# Patient Record
Sex: FEMALE | Race: WHITE
[De-identification: names, ages, dates, MRNs, and addresses within clinical notes are randomized per-mention and may not be internally consistent; named-entity substitution may affect disease eponyms.]

---

## 2017-09-06 ENCOUNTER — HOSPITAL ENCOUNTER (EMERGENCY)
Dept: HOSPITAL 76 - ED | Age: 32
Discharge: HOME | End: 2017-09-06
Payer: COMMERCIAL

## 2017-09-06 VITALS — DIASTOLIC BLOOD PRESSURE: 84 MMHG | SYSTOLIC BLOOD PRESSURE: 128 MMHG
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
## 2017-09-06 DIAGNOSIS — Z23: ICD-10-CM

## 2017-09-06 DIAGNOSIS — S91.331A: Primary | ICD-10-CM

## 2017-09-06 PROCEDURE — 90471 IMMUNIZATION ADMIN: CPT

## 2017-09-06 PROCEDURE — 99283 EMERGENCY DEPT VISIT LOW MDM: CPT

## 2017-09-06 NOTE — ED PHYSICIAN DOCUMENTATION
PD HPI LOWER EXT INJURY





- Stated complaint


Stated Complaint: RT FOOT INJ





- Chief complaint


Chief Complaint: General





- History of Present Illness


PD HPI LOW EXT INJURY LOCATION: Right, Foot


Type of injury: Puncture wound


Where injury occurred: Street


Timing - onset: Today


Timing - details: Abrupt onset


Improved by: Immobilization


Worsened by: Moving, Palpating


Similar symptoms before: Has not had sx before


Recently seen: Not recently seen





- Additional information


Additional information: 





Patient is a 32 year old female with no significant past medical history who is 

presenting to the emergency department for a puncture wound.  patient states 

that she stepped on a nail earlier today.  





Review of Systems


Constitutional: denies: Fever, Chills


Eyes: reports: Loss of vision, Reviewed and negative


Nose: reports: Reviewed and negative


Throat: reports: Reviewed and negative


Respiratory: denies: Cough, Wheezing


GI: denies: Nausea, Vomiting


: reports: Reviewed and negative


Skin: reports: Lesions, Laceration (s)


Musculoskeletal: reports: Extremity pain.  denies: Extremity swelling


Neurologic: denies: Focal weakness, Numbness


Immunocompromised: denies: Immunocompromised





PD PAST MEDICAL HISTORY





- Past Medical History


Cardiovascular: None


Respiratory: None


Neuro: None


Endocrine/Autoimmune: None


GI: None


GYN: None


: None


HEENT: None


Psych: Anxiety


Musculoskeletal: None


Derm: Eczema





- Past Surgical History


Past Surgical History: No





- Present Medications


Home Medications: 


 Ambulatory Orders











 Medication  Instructions  Recorded  Confirmed


 


Ciprofloxacin/Ciprofloxa HCl 500 mg PO BID #10 tbmp.24hr 09/06/17 





[Ciprofloxacin  mg Tablet]   


 


DULoxetine [Cymbalta] 30 mg PO DAILY 09/06/17 09/06/17


 


Gabapentin 100 mg PO 09/06/17 














- Allergies


Allergies/Adverse Reactions: 


 Allergies











Allergy/AdvReac Type Severity Reaction Status Date / Time


 


No Known Drug Allergies Allergy   Verified 09/06/17 19:57














- Social History


Does the pt smoke?: No


Smoking Status: Never smoker


Does the pt drink ETOH?: No





- Immunizations


Immunizations are current?: No


Immunizations: TDAP >10years/unknown





PD ED PE NORMAL





- Vitals


Vital signs reviewed: Yes





- General


General: Alert and oriented X 3, No acute distress





- HEENT


HEENT: Atraumatic, PERRL





- Neck


Neck: Supple, no meningeal sign





- Cardiac


Cardiac: RRR, No murmur





- Respiratory


Respiratory: No respiratory distress





- Abdomen


Abdomen: Soft, Non tender, Non distended





- Derm


Derm: Warm and dry





- Neuro


Neuro: Alert and oriented X 3, No motor deficit, No sensory deficit, Normal 

speech





- Psych


Psych: Normal mood, Normal affect





PD ED PE EXPANDED





- Extremities


Extremities: Right foot (miniscule puncture wound of left foot, no erythema, 

minimal tenderness)





Results





- Vitals


Vitals: 


 Vital Signs - 24 hr











  09/06/17





  19:56


 


Temperature 36.3 C L


 


Heart Rate 83


 


Respiratory 16





Rate 


 


Blood Pressure 128/84 H


 


O2 Saturation 100








 Oxygen











O2 Source                      Room air

















PD MEDICAL DECISION MAKING





- ED course


Complexity details: reviewed old records, re-evaluated patient, d/w patient


ED course: 





Patient was seen and examined at bedside.  patient's wound was well appearing.  

Patient was treated with tetanus.  Patient was given detailed discharge 

instructions including antibiotics if necessary.  patient was stable for 

discharge with outpatient follow up.  





Departure





- Departure


Disposition: 01 Home, Self Care


Clinical Impression: 


 Puncture wound of foot


Condition: Good


Instructions:  ED Wound Puncture General


Prescriptions: 


Ciprofloxacin/Ciprofloxa HCl [Ciprofloxacin  mg Tablet] 500 mg PO BID #10 

tbmp.24hr


Comments: 


Your wound today looked well appearing.  I will write you for antibiotics but 

will only need to take them if you see any signs of infection (increased redness

, swelling or pain).  You should return to the emergency department at any time 

for new, worsening or uncontrollable symptoms.  


Discharge Date/Time: 09/06/17 21:04

## 2020-07-08 ENCOUNTER — HOSPITAL ENCOUNTER (OUTPATIENT)
Dept: HOSPITAL 76 - DI | Age: 35
Discharge: HOME | End: 2020-07-08
Attending: FAMILY MEDICINE
Payer: COMMERCIAL

## 2020-07-08 DIAGNOSIS — R93.6: ICD-10-CM

## 2020-07-08 DIAGNOSIS — M67.814: ICD-10-CM

## 2020-07-08 DIAGNOSIS — M75.112: Primary | ICD-10-CM

## 2020-07-08 NOTE — MRI REPORT
PROCEDURE:  Shoulder LT W/O

 

INDICATIONS:  SOFT TISSUE DISORDER

 

TECHNIQUE:  

Noncontrast oblique coronal T2 fast spin echo with fat saturation, oblique sagittal T1 spin echo and 
T2 fast spin echo with fat saturation, axial T1 spin echo and T2 fast spin echo with fat saturation t
hrough the shoulder.  

 

COMPARISON:  None.

 

FINDINGS:  

Image quality:  Excellent.  

 

Rotator cuff: Tendinosis and low-grade articular and bursal surface partial thickness tear involving 
distal supraspinatus at its insertion on humeral head is seen. Distal infraspinatus and subscapularis
 tendons are grossly intact. No full-thickness rotator cuff tendon rupture. No rotator cuff muscle at
rophy on sagittal images.  

 

Bones and bursae:  No bone marrow contusions or fractures.  No acromioclavicular joint degeneration. 
 The acromion demonstrates conventional anatomy, without an os acromiale.  No pathologic subacromial/
subdeltoid bursal fluid is present.  

 

Capsule and soft tissues:  In the absence of intra-articular contrast, there is signal abnormality an
d contour irregularity involving superior anterior labrum at 12 to 1:00 position suggestive of superi
or anterior labral tear. The glenohumeral ligaments appear intact.  The long head of the biceps tendi
nosis is seen. The rotator interval appears normal, without fibrosis.  The coracohumeral ligament is 
normal in thickness.  

 

IMPRESSION:

1. Tendinosis and low-grade articular and bursal surface partial-thickness tear involving distal supr
aspinatus at its insertion on the humeral head. No full-thickness rotator cuff tendon rupture.

2. No marrow edema. No fracture or dislocation.

3. Finding is concerning for subtle superior anterior labral tear at 12 to 1:00 position.

4. Proximal intra-articular portion of long head of biceps tendinosis.

 

Reviewed by: Franc Poole MD on 7/8/2020 2:19 PM PDT

Approved by: Franc Poole MD on 7/8/2020 2:19 PM PDT

 

 

Station ID:  [id number]

## 2021-04-13 ENCOUNTER — HOSPITAL ENCOUNTER (OUTPATIENT)
Dept: HOSPITAL 76 - DI | Age: 36
Discharge: HOME | End: 2021-04-13
Attending: FAMILY MEDICINE
Payer: COMMERCIAL

## 2021-04-13 DIAGNOSIS — R53.1: ICD-10-CM

## 2021-04-13 DIAGNOSIS — R25.1: ICD-10-CM

## 2021-04-13 DIAGNOSIS — R00.2: Primary | ICD-10-CM

## 2021-04-13 PROCEDURE — 93306 TTE W/DOPPLER COMPLETE: CPT
